# Patient Record
Sex: FEMALE | Race: WHITE
[De-identification: names, ages, dates, MRNs, and addresses within clinical notes are randomized per-mention and may not be internally consistent; named-entity substitution may affect disease eponyms.]

---

## 2023-04-05 ENCOUNTER — HOSPITAL ENCOUNTER (EMERGENCY)
Dept: HOSPITAL 54 - ER | Age: 38
Discharge: HOME | End: 2023-04-05
Payer: COMMERCIAL

## 2023-04-05 VITALS — HEIGHT: 63 IN | WEIGHT: 200 LBS | BODY MASS INDEX: 35.44 KG/M2

## 2023-04-05 VITALS — DIASTOLIC BLOOD PRESSURE: 66 MMHG | SYSTOLIC BLOOD PRESSURE: 132 MMHG

## 2023-04-05 DIAGNOSIS — T78.1XXA: Primary | ICD-10-CM

## 2023-04-05 DIAGNOSIS — Z79.899: ICD-10-CM
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
## 2023-04-05 DIAGNOSIS — Z98.890: ICD-10-CM

## 2023-04-05 NOTE — NUR
Pt ius noted alert, responsive as she is brought in by  C/O Allergic 
Reaction [id number] with Generalizer Rash and difficulty breathing" THROAT 
CLOSING UP". Pt care continue as awaist MD orders.

## 2023-04-05 NOTE — NUR
Pt is noted off the unit as she is feel better with no s/s off distress or 
difficulty breathing . She is been discharge to home with all discharge 
instruction given.